# Patient Record
Sex: MALE | Race: WHITE | NOT HISPANIC OR LATINO | Employment: FULL TIME | URBAN - METROPOLITAN AREA
[De-identification: names, ages, dates, MRNs, and addresses within clinical notes are randomized per-mention and may not be internally consistent; named-entity substitution may affect disease eponyms.]

---

## 2018-05-07 ENCOUNTER — TELEPHONE (OUTPATIENT)
Dept: NEUROLOGY | Facility: CLINIC | Age: 78
End: 2018-05-07

## 2018-05-07 NOTE — TELEPHONE ENCOUNTER
Spoke to Pt he verbalized his appt date and time. Appt letter mailed     ----- Message from Geneva Christian sent at 5/7/2018 11:02 AM CDT -----  Contact: Pt can be reached at [phone]  Pt is calling to schedule an appt in your department. Pt has an referral from Dr. Kauffman from [facility]      Thank you!

## 2018-07-05 ENCOUNTER — INITIAL CONSULT (OUTPATIENT)
Dept: NEUROLOGY | Facility: CLINIC | Age: 78
End: 2018-07-05
Payer: MEDICARE

## 2018-07-05 DIAGNOSIS — F06.70 MILD NEUROCOGNITIVE DISORDER DUE TO ALZHEIMER'S DISEASE: ICD-10-CM

## 2018-07-05 DIAGNOSIS — G30.9 MILD NEUROCOGNITIVE DISORDER DUE TO ALZHEIMER'S DISEASE: ICD-10-CM

## 2018-07-05 DIAGNOSIS — R41.3 MEMORY DISORDER: ICD-10-CM

## 2018-07-05 PROCEDURE — 99499 UNLISTED E&M SERVICE: CPT | Mod: S$GLB,,, | Performed by: CLINICAL NEUROPSYCHOLOGIST

## 2018-07-05 PROCEDURE — 96118 PR NEUROPSYCH TESTING BY PSYCH/PHYS: CPT | Mod: S$GLB,,, | Performed by: CLINICAL NEUROPSYCHOLOGIST

## 2018-07-05 PROCEDURE — 90791 PSYCH DIAGNOSTIC EVALUATION: CPT | Mod: S$GLB,,, | Performed by: CLINICAL NEUROPSYCHOLOGIST

## 2018-07-05 NOTE — PROGRESS NOTES
"Outpatient Neuropsychological Evaluation    Referral Information  Name: Junito Burk  MRN: [id number]  MCGARRY: 2018  : 1940  Age: 77 y.o.  Handedness: Right  Race: White  Gender: male  Referring Provider: Naseem Kauffman MD  Referral Reason/Medical Necessity: Cognitive difficulties with neuropsychological evaluation ordered by Primary Care to characterize cognitive status, differential diagnosis, and updated treatment recommendations.   Billing:Total licensed neuropsychologists professional time includes: clinical interview (55139: 60-minutes), test administration and interpretation of tests administered by the billing neuropsychologist, integration of test results and other clinical data, preparing the final report, and personally reporting results to the patient ([id number])= 4 hours.   Consent: The patient expressed an understanding of the purpose of the evaluation and consented to all procedures.    HPI  See below for active medical problems.     Current Symptoms  Cognitive Sxs:  · Attention:   · Per Pt:  No trouble  · Per Wife:  Very attentive and very detail oriented.   · Mental Speed:  · Per Pt:  No trouble  · Per Wife:  No trouble  · Memory:   · Onset was 2-3 years ago. He notices it less than other people. Main issue that others (wife/children) notice is that he will tell the same story and not realize that he has told it before. He denied trouble remembering conversations and upcoming/recent events. He added that he has always been a list-maker and continues to do this. He denied any major decline over the last few years, per patient.   · Per Wife: "He has more trouble than he thinks he does particularly with memory."  He forgets that his wife mentioned activities and events. He will forget instructions immediately after she says them at times. Wife adds that he doesn't retain the news as well when compared to prior years. Course has been a decline in the past 2-3 years with a more noticeable " "decline in the past year. She adds that he jokes and minimizes cognitive changes and that can frustrate her.   · Language:  · Per Pt: No changes  · Per Wife: No changes  · Visuospatial/Perceptual:  · Per Pt: No changes  · Per Wife: No changes  · Executive Functioning:  · Per Pt:  None  · Per Wife:  Always been the most methodical person but now much less organized and detail oriented. For instance, he forgets more items when she has him go to the grocery store. She also recently took over managing their travel arrangements.      [Onset/Course]: Onset of memory trouble was 2-3 years ago. Wife reports that she and family notice more than the patient. While patient denies any major decline over time, his wife endorsed a gradual decline for the past 2-3 years with a more noticeable decline in the last year.    Neuropsychiatric Sxs:  · Mood:   · Depression: Upbeat and no depression, per patient. Per wife, he stopped doing a lot after care home and didn't have many hobbies. No change in social connections or social activity, however.   · Anxiety: "Always been a worrier", but never any functional impairment or treatment for this. Per wife, he is not much of a worrier, actually.   · Other:  NA  · Neurovegetative:  · Sleep: "Fine"  · Appetite: "Fine"  · Energy: Normal  · Behavioral Concerns: None  · Delusions/Hallucinations: None  · SI/HI: None    Physical Sxs:  · Motor: Normal  · Sensory: Normal  · Pain: Mild arthritis    Current Functioning (I/ADLs):  · ADLs:  Independent  · IADLs: Independent  · Finances: Wife is handling more of the finances now that she has in the past. This started a year ago b/c he was later on bills and forgetting about them.   · Medication Mgmt: He only has 2 meds, but knows them and takes them reliably.  · Driving: No major problems locally per wife.     · Household Mgmt: Some forgetting items at grocery store. Wife handles planning of the trips now and he used to do this.     Family History " "  Problem Relation Age of Onset    Cancer Mother         lung    Stroke Father      Family Neurologic History: Alzheimer's (Brother 73 and now 76yo) Dementia (Father in mid-late 70s)  Family Psychiatric History: ?Anxiety     Developmental/Academic Hx:  Developmental: No gestational or later developmental concerns.  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 [place]  · Now: Part time book keeping for same business     MEDICAL HISTORY  Patient Active Problem List   Diagnosis    Venous insufficiency     Past Medical History:   Diagnosis Date    Elevated cholesterol      Past Surgical History:   Procedure Laterality Date    EYE SURGERY      HERNIA REPAIR      KNEE ARTHROPLASTY       Neurologic History   · TBI:     None  · Seizures:    None  · Stroke:    None  · Movement Disorder:  None    No results found for: [id number]  No results found for: RPR  No results found for: FOLATE  No results found for: TSH, N8CJGKM, K8VEQYR, THYROIDAB  No results found for: LABA1C, HGBA1C  No results found for: HIV1X2, [id number]    Neurodiagnostics  None in System      Current Outpatient Prescriptions:     FLUZONE HIGH-DOSE 2016-17, PF, 180 " "mcg/0.5 mL Syrg, , Disp: , Rfl:     pravastatin (PRAVACHOL) 20 MG tablet, Take 20 mg by mouth once daily., Disp: , Rfl:     Psychiatric Hx:  · Childhood: None  · Adult: No treatment but history of anxiety  · Substance Use: 1 glass of wine nightly    Social History     Social History Main Topics    Smoking status: Never Smoker    Smokeless tobacco: Never Used    Alcohol use 0.6 oz/week     1 Glasses of wine per week      Comment: glass of wine    Drug use: No    Sexual activity: Not on file       MENTAL STATUS AND OBSERVATIONS:  APPEARANCE: Casually dressed and adequate grooming/hygiene.   ALERTNESS/ORIENTATION: Attentive and alert. Fully oriented (x5) to time and place  GAIT: Unremarkable  MOTOR MOVEMENTS/MANNERISMS: Unremarkable  SPEECH/LANGUAGE: Normal in rate, rhythm, tone, and volume. No significant word finding difficulty noted. Expressive and receptive language was normal.  STATED MOOD/AFFECT: The patients stated mood was "good." Affect was congruent with stated mood.   INTERPERSONAL BEHAVIOR: Rapport was quickly and easily established   SUICIDALITY/HOMICIDALITY: Denied  HALLUCINATIONS/DELUSIONS: None evidenced or endorsed  THOUGHT PROCESSES: Thoughts seemed logical and goal-directed.   TEST TAKING BEHAVIOR and VALIDITY: Embedded performance validity measures was generally suggestive of adequate engagement. Observation of effort, however, was more variable. It should be noted that Mr. Burk had a fairly fast response style across testing and would often stop very quickly particularly with memory tests. For instance, on the MMSE and the RBANS Delayed Recall, he said, "I don't know" immediately after asking him to recall the words rather than taking a moment to think about it. When copying the figure, he was very rushed and haphazard when drawing. While these observations are notable, they do not invalidate the data below. Instead, it tends to align with his wife's report of increased disorganization, " being flippant when asked about cognitive changes, and not wanting to be confronted with those changes. The current results, therefore, are likely a valid reflection of the patient's current functioning. But, his response style should be noted when interpreting findings overall particularly if re-evaluation shows a different response style.     PROCEDURES/TESTS ADMINISTERED:  In addition to performing a review of pertinent medical records, reviewing limits to confidentiality, conducting a clinical interview, and explaining procedures, the following measures were administered:  Mini-Mental State Examination (MMSE; Sycamore et al., 1993 norms); Test of Pre-Morbid Functioning (TOPF), Wechsler Adult Intelligence Scale-Fourth Edition (WAIS-IV Digit Span and Similarities), Symbol Digit Modalities Test (SDMT); Trail Making Test, parts A and B (Luis et al., 2004 norms); [place] Naming Test (BNT-60; Luis et al., 2004 norms) Category and Letter-cued verbal fluency (animal naming/FAS; Luis et al., 2004 norms); Repeatable Battery for the Assessment of Neuropsychological Status (RBANS: A: Update); The Category Test (Luis, et al, 2004 norms); Geriatric Depression Scale (GDS-30); Generalized Anxiety Disorder (FELICITY-7);Manual norms were used unless otherwise indicated.      TEST RESULTS  PERFORMANCE VALIDITY  RDS..................................11 / Valid    Percentile Interpretation:        </=3rd......................................Abnormal        4th-9th.....................................Borderline Abnormal        10th-24th...................................Low Average        25th-74th...................................Average        75th-90th...................................High Average        91st-97th...................................Superior        >/=97th.....................................Very Superior        SCREENING OF GENERAL COGNITIVE FUNCTIONING:    MMSE (total score/%ile):     Total Score (max =  30)...............26 / Impaired  Orientation to time..................5 / 5  Orientation to place.................4 / 5    RBANS-A :  SUBTEST SCORES (raw score/percentile):   List Learning............................17 / 2nd  Story Memory.............................19 / 63rd  Figure Copy..............................18 / 50th  Line Orientation.........................16 / 26-50  Picture Naming...........................10 / 51-75  Semantic Fluency.........................20 / 50th  Digit Span...............................9 / 25th  Coding...................................43 / 63rd  List Recall..............................0 / <=2  List Recognition.........................13 / <=2  Story Recall.............................4 / 5th  Figure Recall............................3 / 1st     INDEX SCORES (standard score/percentile)  Immediate Memory.........................85 / 16th  Visuospatial/Constructional..............96 / 39th  Language................................101 / 53rd  Attention................................97 / 42nd  Delayed Memory...........................48 / <1st  Full Score...............................81 / 10th       ESTIMATED FSIQ and READING LEVEL:      [facility]-TOPF (Raw/SS/%ile)...............66 / 127 / 96th      AUDITORY ATTENTION AND WORKING MEMORY    PPIH-IS-Yhjkr Span        Forward raw..........................13 / 95th      Forward span.........................8 /       Backward raw.........................12 / 98th      Backward span........................6 /       Sequencing raw.......................8 / 63rd      Sequencing span......................5 /       Overall (SS/percentile)..............15 / 95th          MOTOR AND ORAL PROCESSING SPEED     Trail Making Test (sec. to completion/percentile):        Part A .....................................54 / 8th          Errors..................................2 /             SDMT (total correct/percentile):        Oral  Form...................................43 / 23rd      Written Form................................44 / 52nd      LANGUAGE FUNCTIONING    WORD PRODUCTIVITY    Verbal Fluency Tests (raw/percentiles):        FAS.........................................36 / 34th      Animals.....................................15 / 31st      CONFRONTATION NAMING    [place] Naming Test (raw/percentile)        Total Spontaneous (max. = 60)...............55/ 58th      VERBAL REASONING    WAIS-IV (scaled score/percentile):        Similarities................................14 / 91st      EXECUTIVE FUNCTIONING    Trail Making Test (sec. to completion/%ile):        Part B ......................................61 / 90th          Errors...................................0 /   Category Test (raw/%ile)        Total Errors................................111 / 7th      SELF-REPORTED MOOD  FELICITY-7...........................................4 / Mild Anxiety  GDS.............................................5 / Minimal Dep      OVERALL SUMMARY  Mr. Burk has no major medical problems aside from problems listed above. Wife has noticed a progressive decline in short-term memory functioning for the past 2-3 years with a more noticeable decline in the last year with executive dysfunction. Functionally, she is helping him out more now for aspects of finances, household activities, longer distance travel, and shopping. The patient's baseline or pre-morbid intellectual functioning was estimated to be in the above average to superior range based on educational/occupational history and performance on a word reading measure. Results should be interpreted in that context.    Global mental status was below expectations (MMSE=26/30) but he was largely oriented to time/place. Attention/working memory was quite strong on most measures and consistent with demographic expectations. Mental speed was largely within normal limits. However, he scored atypically low on one  "measure (Trails A) which is more likely due to his impulsive/disorganized response style rather than slowed mental speed. Basic expressive and receptive language was normal range. Object naming was normal range. Verbal fluency was normal range for phonemic and semantic tasks. There was no significant difference between the two measures, notably. Basic visuoperception was normal range. Planning/organization when copying a figure was below expectations and he had a haphazard approach.    Learning and memory assessment revealed important findings. He had much more difficulty learning an unstructured list of of words, which is often the case when patients have more executive dysfunction. On a more structured/organized story, his learning was within normal limits. After a delay, recall or "memory" was impaired across three separate measures. Recognition memory did not significantly improve his recall, unfortunately. Executive functioning was variable. Set-shifting was above average to superior. Phonemic fluency was average. Planning/organization when copying a figure was below expectations and he had a haphazard approach. Reasoning on the Category Test was borderline impaired and much lower when compared to his baseline above average functioning.     Psychiatrically, he has some mild longstanding anxiety, but this doesn't impact his functioning.     Overall, Mr. Burk is on the borderzone between a Mild Neurocognitive Disorder and a Major Neurocognitive Disorder or "dementia." The reason for the lack of clarity between "MCI" versus "dementia" involves uncertainty regarding his overall functional status. It appears that his wife is providing some assistance, but he is generally managing many tasks without difficulty. Regarding cause of his cognitive impairment, many aspects of his profile (significant memory difficulty, minimal benefit from recognition cues, aspects of executive dysfunction; normal attention/mental " speed) coupled with the onset/course of his cognitive symptoms generally suggest an Alzheimer's pathology/cause. His reduced insight into cognitive difficulty and family history lend additional support for an Alzheimer's etiology. He has few vascular risk factors and no other known symptoms associated with other progressive causes of cognitive impairment. Psychiatric sxs are mild and not impairing. Lastly, I do not have any labs available and recommend reversible dementia labs be performed prior to any firm diagnosis. 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 Harris treatment providers. Specific areas of concern include:   · Cognitive Difficulties: Recommend consideration of a cholinesterase inhibitor  · Labs: If not performed, recommend reversible dementia panel (B12, Folate, TSH, RPR, HIV, etc.)  · Driving Evaluation: Based on these findings it is recommended that Mr. Burk have a formal, on-the-road driving evaluation. This evaluation can be completed at the [facility] with Alonso Hayden. If interested, I can place a referral and the family can contact Chel Pedro at [phone] for scheduling.  · Neuropsychology: Neuropsychological reevaluation is recommended in 12-months following implementation of recommendations to track cognitive and functional status, help family plan, and update treatment recommendations.     Recommendations for Mr. Burk and Caregivers/Family:   · Brain  Health: Will provide our lengthy handout on improving vascular health and brain health. This includes recommendations for physical activity, healthy diet (e.g., Mediterranean Style Diet), social activity, and mental activity.   · Practice good cognitive hygiene:  · Engage in regular exercise, which increases alertness and arousal and can improve attention and focus.  Consider lower impact exercises, such as yoga or light walking.  · Get a good nights sleep, as this can enhance alertness and cognition.  · Eat healthy foods and balanced meals. It is notable that research indicates certain nutrients may aid in brain function, such as B vitamins (especially B6, B12, and folic acid), antioxidants (such as vitamins C and E, and beta carotene), and Omega-3 fatty acids. Talk with your physician or nutritionist about whats right for you.   · Keep your brain active. Find activities to stay mentally active, such as reading, games (cards, checkers), puzzles (crosswords, Sudoku, jig saw), crafts (models, woodworking), gardening, or participating in activities in the community.  · Stay socially engaged. Continue staying active with your family and friends.  · Prepare for the future: Mr. Burk and caregivers should consider formal arrangements to allow a designated person to make medical and financial decisions for Mr. Burk, should he become unable to do so.  Options to consider include designating a healthcare proxy, medical and/or financial power of , and completing advanced directives for healthcare decisions and estate planning (e.g., finalizing a will).  If cost is prohibitive, [facility] [employer] (https://ls.org/) provides free  for individuals with low income.

## 2018-07-13 ENCOUNTER — OFFICE VISIT (OUTPATIENT)
Dept: NEUROLOGY | Facility: CLINIC | Age: 78
End: 2018-07-13
Payer: MEDICARE

## 2018-07-13 DIAGNOSIS — G30.9 MILD NEUROCOGNITIVE DISORDER DUE TO ALZHEIMER'S DISEASE: Primary | ICD-10-CM

## 2018-07-13 DIAGNOSIS — F06.70 MILD NEUROCOGNITIVE DISORDER DUE TO ALZHEIMER'S DISEASE: Primary | ICD-10-CM

## 2018-07-13 PROCEDURE — 99499 UNLISTED E&M SERVICE: CPT | Mod: S$GLB,,, | Performed by: CLINICAL NEUROPSYCHOLOGIST

## 2018-07-13 NOTE — PROGRESS NOTES
Neuropsychology Feedback Note    Date of Session: 07/13/2018  Session Content: Results and recommendations were discussed for 50-minutes with patient and wife. Review Neuropsychology Consult dated for details. No further neuropsychology feedback needed.

## 2019-07-15 ENCOUNTER — OFFICE VISIT (OUTPATIENT)
Dept: INTERNAL MEDICINE | Facility: CLINIC | Age: 79
End: 2019-07-15
Payer: MEDICARE

## 2019-07-15 VITALS
HEIGHT: 72 IN | BODY MASS INDEX: 22.58 KG/M2 | SYSTOLIC BLOOD PRESSURE: 124 MMHG | HEART RATE: 70 BPM | DIASTOLIC BLOOD PRESSURE: 78 MMHG | TEMPERATURE: 98 F | RESPIRATION RATE: 18 BRPM | WEIGHT: 166.69 LBS

## 2019-07-15 DIAGNOSIS — J32.9 SINUSITIS, UNSPECIFIED CHRONICITY, UNSPECIFIED LOCATION: Primary | ICD-10-CM

## 2019-07-15 PROCEDURE — 99203 OFFICE O/P NEW LOW 30 MIN: CPT | Mod: S$GLB,,, | Performed by: INTERNAL MEDICINE

## 2019-07-15 PROCEDURE — 99203 PR OFFICE/OUTPT VISIT, NEW, LEVL III, 30-44 MIN: ICD-10-PCS | Mod: S$GLB,,, | Performed by: INTERNAL MEDICINE

## 2019-07-15 PROCEDURE — 1101F PT FALLS ASSESS-DOCD LE1/YR: CPT | Mod: CPTII,S$GLB,, | Performed by: INTERNAL MEDICINE

## 2019-07-15 PROCEDURE — 1101F PR PT FALLS ASSESS DOC 0-1 FALLS W/OUT INJ PAST YR: ICD-10-PCS | Mod: CPTII,S$GLB,, | Performed by: INTERNAL MEDICINE

## 2019-07-15 PROCEDURE — 99999 PR PBB SHADOW E&M-EST. PATIENT-LVL III: CPT | Mod: PBBFAC,,, | Performed by: INTERNAL MEDICINE

## 2019-07-15 PROCEDURE — 99999 PR PBB SHADOW E&M-EST. PATIENT-LVL III: ICD-10-PCS | Mod: PBBFAC,,, | Performed by: INTERNAL MEDICINE

## 2019-07-15 RX ORDER — FLUTICASONE PROPIONATE 50 MCG
1 SPRAY, SUSPENSION (ML) NASAL DAILY
Qty: 16 G | Refills: 0 | Status: SHIPPED | OUTPATIENT
Start: 2019-07-15

## 2019-07-15 RX ORDER — LEVOCETIRIZINE DIHYDROCHLORIDE 5 MG/1
5 TABLET, FILM COATED ORAL NIGHTLY
Qty: 30 TABLET | Refills: 0 | Status: SHIPPED | OUTPATIENT
Start: 2019-07-15 | End: 2019-07-15 | Stop reason: SDUPTHER

## 2019-07-15 RX ORDER — LEVOCETIRIZINE DIHYDROCHLORIDE 5 MG/1
TABLET, FILM COATED ORAL
Qty: 90 TABLET | Refills: 0 | Status: SHIPPED | OUTPATIENT
Start: 2019-07-15

## 2019-07-15 NOTE — PROGRESS NOTES
Subjective:       Patient ID: Junito Burk is a 78 y.o. male.    Chief Complaint: Nasal Congestion    HPI     78-year-old male here for evaluation of nasal congestion.  This started in the last 3-4 days. He has had yellow-green mucus coming from his nose.  Not much coughing.  No fevers or chills.  He has sinus congestion.  He has tried OTC sudafed maybe.  No chest symptoms.  His wife has the same symptoms for a few days.    Review of Systems   Constitutional: Negative for chills, fatigue and unexpected weight change.   HENT: Positive for congestion and postnasal drip. Negative for sore throat.    Eyes: Negative for redness and visual disturbance.   Respiratory: Positive for cough. Negative for shortness of breath.    Cardiovascular: Negative for chest pain and palpitations.   Gastrointestinal: Negative for abdominal pain, constipation, diarrhea, nausea and vomiting.   Genitourinary: Negative for dysuria, frequency and hematuria.   Musculoskeletal: Negative for arthralgias and myalgias.   Skin: Negative for color change and rash.   Neurological: Negative for dizziness and headaches.       Objective:      Physical Exam   Constitutional: He is oriented to person, place, and time. He appears well-developed and well-nourished.   HENT:   Head: Normocephalic and atraumatic.   Mouth/Throat: No oropharyngeal exudate.   Eyes: Pupils are equal, round, and reactive to light. EOM are normal. Right eye exhibits no discharge. Left eye exhibits no discharge. No scleral icterus.   Neck: Normal range of motion. Neck supple. No tracheal deviation present. No thyromegaly present.   Cardiovascular: Normal rate, regular rhythm and normal heart sounds. Exam reveals no gallop and no friction rub.   No murmur heard.  Pulmonary/Chest: Effort normal and breath sounds normal. No respiratory distress. He has no wheezes. He has no rales. He exhibits no tenderness.   Abdominal: Soft. Bowel sounds are normal. He exhibits no distension and no  mass. There is no tenderness. There is no rebound and no guarding.   Musculoskeletal: Normal range of motion. He exhibits no edema or tenderness.   Neurological: He is alert and oriented to person, place, and time.   Skin: Skin is warm and dry. No rash noted. No erythema. No pallor.   Psychiatric: He has a normal mood and affect. His behavior is normal.   Vitals reviewed.      Assessment:       1. Sinusitis, unspecified chronicity, unspecified location        Plan:       1.  Likely viral.  Patient counseled to call back on Thursday if worse or Monday if no better.  Would prescribe an antibiotic at this point.  Flonase and Xyzal prescribed.

## 2021-01-17 ENCOUNTER — IMMUNIZATION (OUTPATIENT)
Dept: INTERNAL MEDICINE | Facility: CLINIC | Age: 81
End: 2021-01-17
Payer: MEDICARE

## 2021-01-17 DIAGNOSIS — Z23 NEED FOR VACCINATION: Primary | ICD-10-CM

## 2021-01-17 PROCEDURE — 91300 COVID-19, MRNA, LNP-S, PF, 30 MCG/0.3 ML DOSE VACCINE: CPT | Mod: PBBFAC | Performed by: FAMILY MEDICINE

## 2021-02-07 ENCOUNTER — IMMUNIZATION (OUTPATIENT)
Dept: INTERNAL MEDICINE | Facility: CLINIC | Age: 81
End: 2021-02-07
Payer: MEDICARE

## 2021-02-07 DIAGNOSIS — Z23 NEED FOR VACCINATION: Primary | ICD-10-CM

## 2021-02-07 PROCEDURE — 91300 COVID-19, MRNA, LNP-S, PF, 30 MCG/0.3 ML DOSE VACCINE: CPT | Mod: PBBFAC | Performed by: FAMILY MEDICINE

## 2021-02-07 PROCEDURE — 0002A COVID-19, MRNA, LNP-S, PF, 30 MCG/0.3 ML DOSE VACCINE: CPT | Mod: PBBFAC | Performed by: FAMILY MEDICINE

## 2021-09-30 ENCOUNTER — IMMUNIZATION (OUTPATIENT)
Dept: PRIMARY CARE CLINIC | Facility: CLINIC | Age: 81
End: 2021-09-30
Payer: MEDICARE

## 2021-09-30 DIAGNOSIS — Z23 NEED FOR VACCINATION: Primary | ICD-10-CM

## 2021-09-30 PROCEDURE — 91300 COVID-19, MRNA, LNP-S, PF, 30 MCG/0.3 ML DOSE VACCINE: CPT | Mod: PBBFAC | Performed by: INTERNAL MEDICINE

## 2021-09-30 PROCEDURE — 0003A COVID-19, MRNA, LNP-S, PF, 30 MCG/0.3 ML DOSE VACCINE: CPT | Mod: CV19,PBBFAC | Performed by: INTERNAL MEDICINE

## 2022-06-05 ENCOUNTER — IMMUNIZATION (OUTPATIENT)
Dept: PRIMARY CARE CLINIC | Facility: CLINIC | Age: 82
End: 2022-06-05

## 2022-06-05 DIAGNOSIS — Z23 NEED FOR VACCINATION: Primary | ICD-10-CM

## 2022-06-05 PROCEDURE — 91300 COVID-19, MRNA, LNP-S, PF, 30 MCG/0.3 ML DOSE VACCINE: CPT | Mod: PBBFAC | Performed by: INTERNAL MEDICINE

## 2022-10-11 ENCOUNTER — IMMUNIZATION (OUTPATIENT)
Dept: PRIMARY CARE CLINIC | Facility: CLINIC | Age: 82
End: 2022-10-11

## 2022-10-11 DIAGNOSIS — Z23 NEED FOR VACCINATION: Primary | ICD-10-CM

## 2022-10-11 PROCEDURE — 91313 COVID-19, MRNA, LNP-S, BIVALENT BOOSTER, PF, 50 MCG/0.5 ML: CPT | Mod: PBBFAC | Performed by: INTERNAL MEDICINE

## 2022-10-11 PROCEDURE — 0134A COVID-19, MRNA, LNP-S, BIVALENT BOOSTER, PF, 50 MCG/0.5 ML: CPT | Mod: CV19,PBBFAC | Performed by: INTERNAL MEDICINE
